# Patient Record
Sex: FEMALE | Race: WHITE | NOT HISPANIC OR LATINO | Employment: PART TIME | URBAN - METROPOLITAN AREA
[De-identification: names, ages, dates, MRNs, and addresses within clinical notes are randomized per-mention and may not be internally consistent; named-entity substitution may affect disease eponyms.]

---

## 2017-05-08 ENCOUNTER — ALLSCRIPTS OFFICE VISIT (OUTPATIENT)
Dept: OTHER | Facility: OTHER | Age: 47
End: 2017-05-08

## 2017-05-31 ENCOUNTER — ALLSCRIPTS OFFICE VISIT (OUTPATIENT)
Dept: OTHER | Facility: OTHER | Age: 47
End: 2017-05-31

## 2017-06-20 ENCOUNTER — GENERIC CONVERSION - ENCOUNTER (OUTPATIENT)
Dept: OTHER | Facility: OTHER | Age: 47
End: 2017-06-20

## 2017-08-10 ENCOUNTER — ALLSCRIPTS OFFICE VISIT (OUTPATIENT)
Dept: OTHER | Facility: OTHER | Age: 47
End: 2017-08-10

## 2017-11-02 ENCOUNTER — GENERIC CONVERSION - ENCOUNTER (OUTPATIENT)
Dept: OTHER | Facility: OTHER | Age: 47
End: 2017-11-02

## 2018-01-10 NOTE — RESULT NOTES
Verified Results  (1) CBC/PLT/DIFF [id number] 11:32AM Cornelius Coats   TW Order Number: [id number]    TW Order Number: [id number]     Test Name Result Flag Reference   WBC COUNT 5 39 Thousand/uL  4 31-10 16   RBC COUNT 4 97 Million/uL  3 81-5 12   HEMOGLOBIN 14 1 g/dL  11 5-15 4   HEMATOCRIT 43 2 %  34 8-46  1   MCV 86 9 fL  82 0-98 0   MCH 28 4 pg  26 8-34 3   MCHC 32 6 g/dL  31 4-37 4   RDW 12 8 %  11 6-15 1   MPV 11 0 fL  8 9-12 7   PLATELET COUNT 175 Thousands/uL  149-390   nRBC AUTOMATED 0 /100 WBCs     NEUTROPHILS RELATIVE PERCENT 56 %  43-75   LYMPHOCYTES RELATIVE PERCENT 34 %  14-44   MONOCYTES RELATIVE PERCENT 7 %  4-12   EOSINOPHILS RELATIVE PERCENT 2 %  0-6   BASOPHILS RELATIVE PERCENT 1 %  0-1   NEUTROPHILS ABSOLUTE COUNT 3 05 Thousands/µL  1 85-7 62   LYMPHOCYTES ABSOLUTE COUNT 1 82 Thousands/µL  0 60-4 47   MONOCYTES ABSOLUTE COUNT 0 38 Thousand/µL  0 17-1 22   EOSINOPHILS ABSOLUTE COUNT 0 08 Thousand/µL  0 00-0 61   BASOPHILS ABSOLUTE COUNT 0 06 Thousands/µL  0 00-0 10     (1) COMPREHENSIVE METABOLIC PANEL [id number] 90:45BF Cornelius Coats    Order Number: [id number]      National Kidney Disease Education Program recommendations are as follows:  GFR calculation is accurate only with a steady state creatinine  Chronic Kidney disease less than 60 ml/min/1 73 sq  meters  Kidney failure less than 15 ml/min/1 73 sq  meters  Test Name Result Flag Reference   GLUCOSE,RANDM 96 mg/dL     If the patient is fasting, the ADA then defines impaired fasting glucose as > 100 mg/dL and diabetes as > or equal to 123 mg/dL     SODIUM 137 mmol/L  136-145   POTASSIUM 4 4 mmol/L  3 5-5 3   CHLORIDE 104 mmol/L  100-108   CARBON DIOXIDE 28 mmol/L  21-32   ANION GAP (CALC) 5 mmol/L  4-13   BLOOD UREA NITROGEN 9 mg/dL  5-25   CREATININE 0 69 mg/dL  0 60-1 30   Standardized to IDMS reference method   CALCIUM 8 7 mg/dL  8 3-10 1   BILI, TOTAL 0 45 mg/dL  0 20-1 00   ALK PHOSPHATAS 63 U/L     ALT (SGPT) 21 U/L 12-78   AST(SGOT) 8 U/L  5-45   ALBUMIN 4 1 g/dL  3 5-5 0   TOTAL PROTEIN 7 0 g/dL  6 4-8 2   eGFR Non-African American      >60 0 ml/min/1 73sq m     (1) TSH WITH FT4 REFLEX 02Feb2016 11:32AM Marielleimmanuel Villeda    Order Number: [id number]    Patients undergoing fluorescein dye angiography may retain small amounts of fluorescein in the body for 48-72 hours post procedure  Samples containing fluorescein can produce falsely depressed TSH values  If the patient had this procedure,a specimen should be resubmitted post fluorescein clearance  The recommended reference ranges for TSH during pregnancy are as follows:  First trimester 0 1 to 2 5 uIU/mL  Second trimester  0 2 to 3 0 uIU/mL  Third trimester 0 3 to 3 0 uIU/m     Test Name Result Flag Reference   TSH 1 110 uIU/mL  0 358-3 740     (1) VITAMIN B12 02Feb2016 11:32AM Marielle [facility]    Order Number: [id number]     Test Name Result Flag Reference   VITAMIN B12 274 pg/mL  100-900     (1) VITAMIN D 25-HYDROXY [id number] 11:32AM Marielle [employer]    Order Number: [id number]    TW Order Number: [id number]     Test Name Result Flag Reference   VIT D 25-HYDROX 19 9 ng/mL L 30 0-100 0     (1) HEMOGLOBIN A1C [id number] 11:32AM Marielle [facility]    Order Number: [id number]      5 7-6 4% impaired fasting glucose  >=6 5% diagnosis of diabetes    Falsely low levels are seen in conditions linked to short RBC life span-  hemolytic anemia, and splenomegaly  Falsely elevated levels are seen in situations where there is an increased production of RBC- receipt of erythropoietin or blood transfusions  Adopted from ADA-Clinical Practice Recommendations     Test Name Result Flag Reference   HEMOGLOBIN A1C 5 4 %  4 0-5 6   EST  AVG   GLUCOSE 108 mg/dl         Plan  Vitamin D deficiency    · Vitamin D (Ergocalciferol) 30231 UNIT Oral Capsule; TAKE 1 CAPSULE WEEKLY

## 2018-01-13 VITALS
WEIGHT: 174.5 LBS | HEIGHT: 67 IN | BODY MASS INDEX: 27.39 KG/M2 | RESPIRATION RATE: 16 BRPM | DIASTOLIC BLOOD PRESSURE: 70 MMHG | SYSTOLIC BLOOD PRESSURE: 120 MMHG | TEMPERATURE: 98.3 F | HEART RATE: 94 BPM

## 2018-01-14 VITALS
RESPIRATION RATE: 18 BRPM | HEART RATE: 76 BPM | DIASTOLIC BLOOD PRESSURE: 74 MMHG | HEIGHT: 67 IN | TEMPERATURE: 97.3 F | WEIGHT: 178 LBS | SYSTOLIC BLOOD PRESSURE: 116 MMHG | BODY MASS INDEX: 27.94 KG/M2

## 2018-01-14 VITALS
WEIGHT: 175.25 LBS | DIASTOLIC BLOOD PRESSURE: 62 MMHG | HEART RATE: 72 BPM | SYSTOLIC BLOOD PRESSURE: 102 MMHG | HEIGHT: 67 IN | RESPIRATION RATE: 16 BRPM | TEMPERATURE: 98.5 F | BODY MASS INDEX: 27.51 KG/M2

## 2018-01-17 NOTE — RESULT NOTES
Message   Will d/w pt at follow up appointment, will send Vit D to pharmacy     Verified Results  (1) CBC/PLT/DIFF [id number] 11:32AM Alejo Hemp Order Number: [id number]    TW Order Number: [id number]     Test Name Result Flag Reference   WBC COUNT 5 39 Thousand/uL  4 31-10 16   RBC COUNT 4 97 Million/uL  3 81-5 12   HEMOGLOBIN 14 1 g/dL  11 5-15 4   HEMATOCRIT 43 2 %  34 8-46  1   MCV 86 9 fL  82 0-98 0   MCH 28 4 pg  26 8-34 3   MCHC 32 6 g/dL  31 4-37 4   RDW 12 8 %  11 6-15 1   MPV 11 0 fL  8 9-12 7   PLATELET COUNT 087 Thousands/uL  149-390   nRBC AUTOMATED 0 /100 WBCs     NEUTROPHILS RELATIVE PERCENT 56 %  43-75   LYMPHOCYTES RELATIVE PERCENT 34 %  14-44   MONOCYTES RELATIVE PERCENT 7 %  4-12   EOSINOPHILS RELATIVE PERCENT 2 %  0-6   BASOPHILS RELATIVE PERCENT 1 %  0-1   NEUTROPHILS ABSOLUTE COUNT 3 05 Thousands/µL  1 85-7 62   LYMPHOCYTES ABSOLUTE COUNT 1 82 Thousands/µL  0 60-4 47   MONOCYTES ABSOLUTE COUNT 0 38 Thousand/µL  0 17-1 22   EOSINOPHILS ABSOLUTE COUNT 0 08 Thousand/µL  0 00-0 61   BASOPHILS ABSOLUTE COUNT 0 06 Thousands/µL  0 00-0 10     (1) COMPREHENSIVE METABOLIC PANEL [id number] 30:56ZN Maria L Cloud    Order Number: [id number]      National Kidney Disease Education Program recommendations are as follows:  GFR calculation is accurate only with a steady state creatinine  Chronic Kidney disease less than 60 ml/min/1 73 sq  meters  Kidney failure less than 15 ml/min/1 73 sq  meters  Test Name Result Flag Reference   GLUCOSE,RANDM 96 mg/dL     If the patient is fasting, the ADA then defines impaired fasting glucose as > 100 mg/dL and diabetes as > or equal to 123 mg/dL     SODIUM 137 mmol/L  136-145   POTASSIUM 4 4 mmol/L  3 5-5 3   CHLORIDE 104 mmol/L  100-108   CARBON DIOXIDE 28 mmol/L  21-32   ANION GAP (CALC) 5 mmol/L  4-13   BLOOD UREA NITROGEN 9 mg/dL  5-25   CREATININE 0 69 mg/dL  0 60-1 30   Standardized to IDMS reference method   CALCIUM 8 7 mg/dL  8 3-10 1   BILI, TOTAL 0 45 mg/dL  0 20-1 00   ALK PHOSPHATAS 63 U/L     ALT (SGPT) 21 U/L  12-78   AST(SGOT) 8 U/L  5-45   ALBUMIN 4 1 g/dL  3 5-5 0   TOTAL PROTEIN 7 0 g/dL  6 4-8 2   eGFR Non-African American      >60 0 ml/min/1 73sq m     (1) TSH WITH FT4 REFLEX [id number] 11:32AM Maria L Cloud    Order Number: [id number]    Patients undergoing fluorescein dye angiography may retain small amounts of fluorescein in the body for 48-72 hours post procedure  Samples containing fluorescein can produce falsely depressed TSH values  If the patient had this procedure,a specimen should be resubmitted post fluorescein clearance          The recommended reference ranges for TSH during pregnancy are as follows:  First trimester 0 1 to 2 5 uIU/mL  Second trimester  0 2 to 3 0 uIU/mL  Third trimester 0 3 to 3 0 uIU/m     Test Name Result Flag Reference   TSH 1 110 uIU/mL  0 358-3 740     (1) VITAMIN B12 [id number] 11:32AM Maria L Cloud    Order Number: [id number]     Test Name Result Flag Reference   VITAMIN B12 274 pg/mL  100-900     (1) VITAMIN D 25-HYDROXY [id number] 11:32AM Maria L Cloud    Order Number: [id number]    TW Order Number: [id number]     Test Name Result Flag Reference   VIT D 25-HYDROX 19 9 ng/mL L 30 0-100 0       Plan  Vitamin D deficiency    · Vitamin D (Ergocalciferol) 53497 UNIT Oral Capsule; TAKE 1 CAPSULE WEEKLY

## 2018-01-22 VITALS
SYSTOLIC BLOOD PRESSURE: 116 MMHG | DIASTOLIC BLOOD PRESSURE: 68 MMHG | TEMPERATURE: 98.7 F | WEIGHT: 185.25 LBS | RESPIRATION RATE: 14 BRPM | HEART RATE: 72 BPM | HEIGHT: 67 IN | BODY MASS INDEX: 29.08 KG/M2

## 2018-07-13 ENCOUNTER — OFFICE VISIT (OUTPATIENT)
Dept: FAMILY MEDICINE CLINIC | Facility: CLINIC | Age: 48
End: 2018-07-13
Payer: COMMERCIAL

## 2018-07-13 VITALS
RESPIRATION RATE: 12 BRPM | DIASTOLIC BLOOD PRESSURE: 82 MMHG | TEMPERATURE: 98.3 F | WEIGHT: 183.8 LBS | SYSTOLIC BLOOD PRESSURE: 120 MMHG | BODY MASS INDEX: 30.62 KG/M2 | HEART RATE: 62 BPM | HEIGHT: 65 IN

## 2018-07-13 DIAGNOSIS — Z13.6 SCREENING FOR CARDIOVASCULAR CONDITION: ICD-10-CM

## 2018-07-13 DIAGNOSIS — E55.9 VITAMIN D DEFICIENCY: ICD-10-CM

## 2018-07-13 DIAGNOSIS — R53.82 CHRONIC FATIGUE: Primary | ICD-10-CM

## 2018-07-13 PROCEDURE — 99213 OFFICE O/P EST LOW 20 MIN: CPT | Performed by: FAMILY MEDICINE

## 2018-07-13 RX ORDER — MULTIVIT-MIN/IRON FUM/FOLIC AC 7.5 MG-4
TABLET ORAL
COMMUNITY

## 2018-07-13 RX ORDER — EPINEPHRINE 0.3 MG/.3ML
INJECTION SUBCUTANEOUS
COMMUNITY
Start: 2013-09-25

## 2018-07-13 NOTE — ASSESSMENT & PLAN NOTE
- patient given scripts for lipid panel, CMP, A1c  - to follow up with Dr Liz Mcpherson  In a month for annual health visit

## 2018-07-13 NOTE — PROGRESS NOTES
Assessment/Plan     Vitamin D deficiency  -  Last vitamin-D level was 20, will recheck    Screening for cardiovascular condition  - patient given scripts for lipid panel, CMP, A1c  - to follow up with Dr Liz Mcpherson  In a month for annual health visit    Chronic fatigue  - will check CBC, TSH  - still not agreeable on antidepressant medications  - patient to follow up with Dr Liz Mcpherson    Diagnoses and all orders for this visit:    Chronic fatigue  -     CBC and Platelet; Future  -     TSH, 3rd generation with T4 reflex; Future    Screening for cardiovascular condition  -     Lipid Panel with Direct LDL reflex; Future  -     Comprehensive metabolic panel; Future    Vitamin D deficiency  -     Vitamin D 25 hydroxy; Future    Other orders  -     EPINEPHrine (EPIPEN) 0 3 mg/0 3 mL SOAJ; Inject as directed  -     Multiple Vitamins-Minerals (MULTIVITAMIN WITH MINERALS) tablet; Take by mouth       Subjective     Chief Complaint   Patient presents with    Insomnia     PT also states she has "stress knots" in her neck         46-year-old female presents to office with complaints of chronic fatigue   And to get script for blood work  Patient states that it has been awhile since she had her blood work done and since  She feels tired all the time wanted to know that everything is fine  She does have depression but does not want to take any pharmacological treatment at this time  Review of Systems   Constitutional: Positive for fatigue  Negative for chills and fever  HENT: Negative for congestion, ear discharge, ear pain, postnasal drip and rhinorrhea  Eyes: Negative for discharge and itching  Respiratory: Negative for shortness of breath  Cardiovascular: Negative for chest pain  Gastrointestinal: Negative for diarrhea, nausea and vomiting  Genitourinary: Negative for difficulty urinating, dysuria and enuresis  Musculoskeletal: Negative for back pain  Neurological: Negative for headaches  Objective     Vitals:Blood pressure 120/82, pulse 62, temperature 98 3 °F (36 8 °C), resp  rate 12, height 5' 5 4" (1 661 m), weight 83 4 kg (183 lb 12 8 oz), last menstrual period 07/02/2018  Physical Exam:  Physical Exam   Constitutional: She is oriented to person, place, and time  She appears well-developed and well-nourished  HENT:   Head: Normocephalic and atraumatic  Mouth/Throat: Oropharynx is clear and moist    Eyes: Conjunctivae and EOM are normal  Pupils are equal, round, and reactive to light  Neck: Normal range of motion  Neck supple  Cardiovascular: Normal rate, regular rhythm, normal heart sounds and intact distal pulses  Exam reveals no friction rub  No murmur heard  Pulmonary/Chest: Effort normal and breath sounds normal  No respiratory distress  She has no wheezes  She has no rales  Abdominal: Soft  Bowel sounds are normal  She exhibits no distension  There is no tenderness  Musculoskeletal: Normal range of motion  Neurological: She is alert and oriented to person, place, and time  Skin: Skin is warm and dry

## 2018-07-13 NOTE — ASSESSMENT & PLAN NOTE
- will check CBC, TSH  - still not agreeable on antidepressant medications,  PHQ-9 score 13  - patient to follow up with Dr Jena Hernández

## 2018-07-16 LAB
25(OH)D3 SERPL-MCNC: 18 NG/ML (ref 30–100)
ALBUMIN SERPL-MCNC: 4.2 G/DL (ref 3.6–5.1)
ALBUMIN/GLOB SERPL: 2.2 (CALC) (ref 1–2.5)
ALP SERPL-CCNC: 56 U/L (ref 33–115)
ALT SERPL-CCNC: 15 U/L (ref 6–29)
AST SERPL-CCNC: 13 U/L (ref 10–35)
BILIRUB SERPL-MCNC: 0.5 MG/DL (ref 0.2–1.2)
BUN SERPL-MCNC: 13 MG/DL (ref 7–25)
BUN/CREAT SERPL: ABNORMAL (CALC) (ref 6–22)
CALCIUM SERPL-MCNC: 9.2 MG/DL (ref 8.6–10.2)
CHLORIDE SERPL-SCNC: 106 MMOL/L (ref 98–110)
CHOLEST SERPL-MCNC: 212 MG/DL
CHOLEST/HDLC SERPL: 4.8 (CALC)
CO2 SERPL-SCNC: 29 MMOL/L (ref 20–31)
CREAT SERPL-MCNC: 0.76 MG/DL (ref 0.5–1.1)
ERYTHROCYTE [DISTWIDTH] IN BLOOD BY AUTOMATED COUNT: 12.2 % (ref 11–15)
GLOBULIN SER CALC-MCNC: 1.9 G/DL (CALC) (ref 1.9–3.7)
GLUCOSE SERPL-MCNC: 103 MG/DL (ref 65–99)
HCT VFR BLD AUTO: 44 % (ref 35–45)
HDLC SERPL-MCNC: 44 MG/DL
HGB BLD-MCNC: 14.5 G/DL (ref 11.7–15.5)
LDLC SERPL CALC-MCNC: 138 MG/DL (CALC)
MCH RBC QN AUTO: 29.1 PG (ref 27–33)
MCHC RBC AUTO-ENTMCNC: 33 G/DL (ref 32–36)
MCV RBC AUTO: 88.4 FL (ref 80–100)
NONHDLC SERPL-MCNC: 168 MG/DL (CALC)
PLATELET # BLD AUTO: 318 THOUSAND/UL (ref 140–400)
PMV BLD REES-ECKER: 10.5 FL (ref 7.5–12.5)
POTASSIUM SERPL-SCNC: 4.7 MMOL/L (ref 3.5–5.3)
PROT SERPL-MCNC: 6.1 G/DL (ref 6.1–8.1)
RBC # BLD AUTO: 4.98 MILLION/UL (ref 3.8–5.1)
SL AMB EGFR AFRICAN AMERICAN: 108 ML/MIN/1.73M2
SL AMB EGFR NON AFRICAN AMERICAN: 93 ML/MIN/1.73M2
SODIUM SERPL-SCNC: 140 MMOL/L (ref 135–146)
TRIGL SERPL-MCNC: 166 MG/DL
TSH SERPL-ACNC: 1.78 MIU/L
WBC # BLD AUTO: 6 THOUSAND/UL (ref 3.8–10.8)

## 2018-07-27 PROBLEM — E78.2 MIXED HYPERLIPIDEMIA: Status: ACTIVE | Noted: 2018-07-27

## 2018-07-27 NOTE — PROGRESS NOTES
Assessment/Plan     Vitamin D deficiency  Vit D 18  Start repletion x 12 weeks  Chronic fatigue  Multifactorial - depression certainly plays a role (PHQ-9 score of 8)  Vit D also quite low  Will replete Vit D  Discussed mindfulness, exercise, and offered therapy visit if patient wishes at some point  She will keep me posted re: her symptoms  Mixed hyperlipidemia  ASCVD risk 1 3%  Discussed nutritional interventions, especially decreasing refined carbs  Pt understands and agrees  rtc 6 months    Subjective     Chief Complaint: f/u fatigue, depressed mood, bloodwork    HPI:   HPI    Seen in followup  Feels that her sleep and fatigue have been stable, with maybe some worsening of fatigue  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  Has had nighttime awakenings with anxiety several times  Walks, listens to music for stress relief  Sometimes notes waking in the morning not feeling refreshed  Reports that her mood is stable, and isn't holding her back from her normal activities  Has some muscle tension in her shoulders up into her neck - does some massage  Uses some Tiger Balm  Sometimes leads to headaches  The following portions of the patient's history were reviewed and updated as appropriate: allergies, current medications, past family history, past medical history, past social history, past surgical history and problem list     Review of Systems  Review of Systems   Constitutional: Positive for fatigue  Negative for activity change, chills and fever  HENT: Negative for congestion, sinus pain, sinus pressure and sore throat  Respiratory: Negative for cough, shortness of breath and wheezing  Cardiovascular: Negative for chest pain, palpitations and leg swelling  Gastrointestinal: Negative for abdominal pain, diarrhea, nausea and vomiting  Genitourinary: Negative for decreased urine volume, dysuria, frequency and urgency     Musculoskeletal: Negative for arthralgias, myalgias, neck pain and neck stiffness  Skin: Negative for rash  Neurological: Negative for dizziness, light-headedness, numbness and headaches  Psychiatric/Behavioral: Positive for sleep disturbance  Negative for dysphoric mood and suicidal ideas  The patient is nervous/anxious  Objective   Vitals:    07/30/18 0834   BP: 120/70   Pulse: 78   Temp: 98 2 °F (36 8 °C)       Physical Exam   Constitutional: She is oriented to person, place, and time  She appears well-developed and well-nourished  No distress  HENT:   Head: Normocephalic and atraumatic  Eyes: Conjunctivae and EOM are normal  Pupils are equal, round, and reactive to light  Neck: Normal range of motion  Neck supple  Cardiovascular: Normal rate, regular rhythm and normal heart sounds  No murmur heard  Pulmonary/Chest: Effort normal and breath sounds normal  No respiratory distress  Abdominal: Soft  Bowel sounds are normal  There is no tenderness  Musculoskeletal: Normal range of motion  Neurological: She is alert and oriented to person, place, and time  Skin: Skin is warm and dry  Psychiatric: She has a normal mood and affect  Her behavior is normal  Judgment and thought content normal        Lab Results:   Orders Only on 07/14/2018   Component Date Value Ref Range Status    Total Cholesterol 07/14/2018 212* <200 mg/dL Final    HDL 07/14/2018 44* >50 mg/dL Final    Triglycerides 07/14/2018 166* <150 mg/dL Final    LDL Direct 07/14/2018 138* mg/dL (calc) Final    Comment: Reference range: <100     Desirable range <100 mg/dL for primary prevention;    <70 mg/dL for patients with CHD or diabetic patients   with > or = 2 CHD risk factors  LDL-C is now calculated using the Sukhjinder-Luis   calculation, which is a validated novel method providing   better accuracy than the Friedewald equation in the   estimation of LDL-C  Tamara Fajardo  [facility]   5624;861(81): 0298-3980 (http://[employer]/faq/VZC520)      Chol HDLC Ratio 07/14/2018 4 8  <5 0 (calc) Final    Non-HDL Cholesterol 07/14/2018 168* <130 mg/dL (calc) Final    Comment: For patients with diabetes plus 1 major ASCVD risk   factor, treating to a non-HDL-C goal of <100 mg/dL   (LDL-C of <70 mg/dL) is considered a therapeutic   option        SL AMB GLUCOSE 07/14/2018 103* 65 - 99 mg/dL Final    Comment:               Fasting reference interval     For someone without known diabetes, a glucose value  between 100 and 125 mg/dL is consistent with  prediabetes and should be confirmed with a  follow-up test          BUN 07/14/2018 13  7 - 25 mg/dL Final    Creatinine, Serum 07/14/2018 0 76  0 50 - 1 10 mg/dL Final    eGFR Non  07/14/2018 93  > OR = 60 mL/min/1 73m2 Final    SL AMB EGFR  07/14/2018 108  > OR = 60 mL/min/1 73m2 Final    SL AMB BUN/CREATININE RATIO 42/61/6969 NOT APPLICABLE  6 - 22 (calc) Final    SL AMB SODIUM 07/14/2018 140  135 - 146 mmol/L Final    SL AMB POTASSIUM 07/14/2018 4 7  3 5 - 5 3 mmol/L Final    SL AMB CHLORIDE 07/14/2018 106  98 - 110 mmol/L Final    SL AMB CARBON DIOXIDE 07/14/2018 29  20 - 31 mmol/L Final    SL AMB CALCIUM 07/14/2018 9 2  8 6 - 10 2 mg/dL Final    SL AMB PROTEIN, TOTAL 07/14/2018 6 1  6 1 - 8 1 g/dL Final    Serum Albumin 07/14/2018 4 2  3 6 - 5 1 g/dL Final    SL AMB GLOBULIN 07/14/2018 1 9  1 9 - 3 7 g/dL (calc) Final    SL AMB ALBUMIN/GLOBULIN RATIO 07/14/2018 2 2  1 0 - 2 5 (calc) Final    SL AMB BILIRUBIN, TOTAL 07/14/2018 0 5  0 2 - 1 2 mg/dL Final    SL AMB ALKALINE PHOSPHATASE 07/14/2018 56  33 - 115 U/L Final    SL AMB AST 07/14/2018 13  10 - 35 U/L Final    SL AMB ALT 07/14/2018 15  6 - 29 U/L Final    SL AMB LAB WHITE BLOOD CELL COUNT 07/14/2018 6 0  3 8 - 10 8 Thousand/uL Final    SL AMB LAB RED BLOOD CELLS 07/14/2018 4 98  3 80 - 5 10 Million/uL Final    Hemoglobin 07/14/2018 14 5  11 7 - 15 5 g/dL Final    Hematocrit 07/14/2018 44 0  35 0 - 45 0 % Final    MCV 07/14/2018 88 4  80 0 - 100 0 fL Final    MCH 07/14/2018 29 1  27 0 - 33 0 pg Final    MCHC 07/14/2018 33 0  32 0 - 36 0 g/dL Final    RDW 07/14/2018 12 2  11 0 - 15 0 % Final    Platelet Count 21/98/7554 318  140 - 400 Thousand/uL Final    SL AMB MPV 07/14/2018 10 5  7 5 - 12 5 fL Final    Vitamin D, 25-Hydroxy, Serum 07/14/2018 18* 30 - 100 ng/mL Final    Comment: Vitamin D Status         25-OH Vitamin D:     Deficiency:                    <20 ng/mL  Insufficiency:             20 - 29 ng/mL  Optimal:                 > or = 30 ng/mL     For 25-OH Vitamin D testing on patients on   D2-supplementation and patients for whom quantitation   of D2 and D3 fractions is required, the QuestAssureD(TM)  25-OH VIT D, (D2,D3), LC/MS/MS is recommended: order   code 91228 (patients >2yrs)  For more information on this test, go to:  http://[employer]/faq/KXT120  (This link is being provided for   informational/educational purposes only )      SL AMB TSH W/ REFLEX TO FREE T4 07/14/2018 1 78  mIU/L Final    Comment:           Reference Range                         > or = 20 Years  0 40-4 50                              Pregnancy Ranges            First trimester    0 26-2 66            Second trimester   0 55-2 73            Third trimester    0 43-2 91     ]

## 2018-07-30 ENCOUNTER — OFFICE VISIT (OUTPATIENT)
Dept: FAMILY MEDICINE CLINIC | Facility: CLINIC | Age: 48
End: 2018-07-30
Payer: COMMERCIAL

## 2018-07-30 ENCOUNTER — TELEPHONE (OUTPATIENT)
Dept: FAMILY MEDICINE CLINIC | Facility: CLINIC | Age: 48
End: 2018-07-30

## 2018-07-30 VITALS
WEIGHT: 185.6 LBS | HEIGHT: 66 IN | HEART RATE: 78 BPM | BODY MASS INDEX: 29.83 KG/M2 | DIASTOLIC BLOOD PRESSURE: 70 MMHG | SYSTOLIC BLOOD PRESSURE: 120 MMHG | TEMPERATURE: 98.2 F

## 2018-07-30 DIAGNOSIS — E55.9 VITAMIN D DEFICIENCY: ICD-10-CM

## 2018-07-30 DIAGNOSIS — E78.2 MIXED HYPERLIPIDEMIA: ICD-10-CM

## 2018-07-30 DIAGNOSIS — R53.82 CHRONIC FATIGUE: Primary | ICD-10-CM

## 2018-07-30 PROCEDURE — 99214 OFFICE O/P EST MOD 30 MIN: CPT | Performed by: FAMILY MEDICINE

## 2018-07-30 PROCEDURE — 3008F BODY MASS INDEX DOCD: CPT | Performed by: FAMILY MEDICINE

## 2018-07-30 RX ORDER — CHOLECALCIFEROL (VITAMIN D3) 1250 MCG
1 CAPSULE ORAL WEEKLY
Qty: 12 CAPSULE | Refills: 0 | Status: SHIPPED | OUTPATIENT
Start: 2018-07-30 | End: 2018-10-16

## 2018-07-30 NOTE — ASSESSMENT & PLAN NOTE
ASCVD risk 1 3%  Discussed nutritional interventions, especially decreasing refined carbs  Pt understands and agrees

## 2018-07-30 NOTE — ASSESSMENT & PLAN NOTE
Multifactorial - depression certainly plays a role (PHQ-9 score of 8)  Vit D also quite low  Will replete Vit D  Discussed mindfulness, exercise, and offered therapy visit if patient wishes at some point  She will keep me posted re: her symptoms

## 2018-08-22 ENCOUNTER — ANNUAL EXAM (OUTPATIENT)
Dept: OBGYN CLINIC | Facility: CLINIC | Age: 48
End: 2018-08-22
Payer: COMMERCIAL

## 2018-08-22 VITALS
WEIGHT: 187 LBS | SYSTOLIC BLOOD PRESSURE: 108 MMHG | BODY MASS INDEX: 31.16 KG/M2 | DIASTOLIC BLOOD PRESSURE: 76 MMHG | HEIGHT: 65 IN

## 2018-08-22 DIAGNOSIS — D25.1 INTRAMURAL LEIOMYOMA OF UTERUS: ICD-10-CM

## 2018-08-22 DIAGNOSIS — Z01.419 ENCOUNTER FOR ANNUAL ROUTINE GYNECOLOGICAL EXAMINATION: Primary | ICD-10-CM

## 2018-08-22 PROCEDURE — 87624 HPV HI-RISK TYP POOLED RSLT: CPT | Performed by: OBSTETRICS & GYNECOLOGY

## 2018-08-22 PROCEDURE — G0143 SCR C/V CYTO,THINLAYER,RESCR: HCPCS | Performed by: OBSTETRICS & GYNECOLOGY

## 2018-08-22 PROCEDURE — S0612 ANNUAL GYNECOLOGICAL EXAMINA: HCPCS | Performed by: OBSTETRICS & GYNECOLOGY

## 2018-08-22 NOTE — PROGRESS NOTES
Assessment/Plan:    Pap smear done as well as annual   Encouraged self-breast examination as well as calcium supplementation  Continue annual mammogram, 3D given breast density  Return to office in 1 year  No problem-specific Assessment & Plan notes found for this encounter  Diagnoses and all orders for this visit:    Encounter for annual routine gynecological examination          Subjective:      Patient ID: Holden Couch is a 53 y o  female  HPI     This is a 78-year-old female  ( x2) presents for annual gyn exam   Patient underwent a NovaSure ablation approximately 2 years ago  Her cycles are regular every 4 weeks lasting 5 days significantly lighter since her ablation  She denies any changes in bowel or bladder function  She is up-to-date with her screening lab work which has been normal  She underwent her mammogram in  which was normal, 3D  Patient is sexually active and has been in a monogamous relationship for over 21 years  All her Pap smears have been normal   Last Pap smear  within normal limits  The following portions of the patient's history were reviewed and updated as appropriate: allergies, current medications, past family history, past medical history, past social history, past surgical history and problem list     Review of Systems   Constitutional: Negative for fatigue, fever and unexpected weight change  Respiratory: Negative for cough, chest tightness, shortness of breath and wheezing  Cardiovascular: Negative  Negative for chest pain and palpitations  Gastrointestinal: Negative  Negative for abdominal distention, abdominal pain, blood in stool, constipation, diarrhea, nausea and vomiting  Genitourinary: Negative  Negative for difficulty urinating, dyspareunia, dysuria, flank pain, frequency, genital sores, hematuria, pelvic pain, urgency, vaginal bleeding, vaginal discharge and vaginal pain  Skin: Negative for rash           Objective:      BP 108/76   Ht 5' 5" (1 651 m)   Wt 84 8 kg (187 lb)   LMP 08/01/2018 (Exact Date)   Breastfeeding? No   BMI 31 12 kg/m²          Physical Exam   Constitutional: She appears well-developed and well-nourished  Cardiovascular: Normal rate and regular rhythm  Pulmonary/Chest: Effort normal and breath sounds normal  Right breast exhibits no inverted nipple, no mass, no nipple discharge, no skin change and no tenderness  Left breast exhibits no inverted nipple, no mass, no nipple discharge, no skin change and no tenderness  Abdominal: Soft  Bowel sounds are normal  She exhibits no distension  There is no tenderness  There is no rebound and no guarding  Genitourinary: Rectum normal, vagina normal and uterus normal  There is no lesion on the right labia  There is no lesion on the left labia  Cervix exhibits no discharge and no friability  Right adnexum displays no mass, no tenderness and no fullness  Left adnexum displays no mass, no tenderness and no fullness  No vaginal discharge found  Genitourinary Comments: Uterus is globular top normal size  Rectovaginal exam is confirmatory

## 2018-08-24 LAB
HPV HR 12 DNA CVX QL NAA+PROBE: NEGATIVE
HPV16 DNA CVX QL NAA+PROBE: NEGATIVE
HPV18 DNA CVX QL NAA+PROBE: NEGATIVE

## 2018-08-27 LAB
LAB AP GYN PRIMARY INTERPRETATION: NORMAL
LAB AP LMP: NORMAL
Lab: NORMAL

## 2018-10-30 DIAGNOSIS — E55.9 VITAMIN D DEFICIENCY: Primary | ICD-10-CM

## 2018-10-30 DIAGNOSIS — E55.9 VITAMIN D DEFICIENCY: ICD-10-CM

## 2018-10-30 RX ORDER — METHOCARBAMOL 750 MG/1
TABLET ORAL WEEKLY
Qty: 12 CAPSULE | Refills: 0 | OUTPATIENT
Start: 2018-10-30 | End: 2019-01-16

## 2019-03-22 DIAGNOSIS — Z13.6 SCREENING FOR CARDIOVASCULAR CONDITION: Primary | ICD-10-CM

## 2019-03-22 DIAGNOSIS — E55.9 VITAMIN D DEFICIENCY: ICD-10-CM

## 2019-03-22 DIAGNOSIS — R53.82 CHRONIC FATIGUE: ICD-10-CM

## 2019-03-22 DIAGNOSIS — E78.2 MIXED HYPERLIPIDEMIA: ICD-10-CM

## 2019-04-10 ENCOUNTER — OFFICE VISIT (OUTPATIENT)
Dept: OBGYN CLINIC | Facility: CLINIC | Age: 49
End: 2019-04-10
Payer: COMMERCIAL

## 2019-04-10 VITALS
SYSTOLIC BLOOD PRESSURE: 128 MMHG | WEIGHT: 190 LBS | BODY MASS INDEX: 31.65 KG/M2 | HEIGHT: 65 IN | DIASTOLIC BLOOD PRESSURE: 84 MMHG

## 2019-04-10 DIAGNOSIS — N76.0 ACUTE VAGINITIS: Primary | ICD-10-CM

## 2019-04-10 DIAGNOSIS — R39.9 LOWER URINARY TRACT SYMPTOMS: ICD-10-CM

## 2019-04-10 LAB
SL AMB  POCT GLUCOSE, UA: ABNORMAL
SL AMB LEUKOCYTE ESTERASE,UA: ABNORMAL
SL AMB POCT BILIRUBIN,UA: ABNORMAL
SL AMB POCT BLOOD,UA: 2
SL AMB POCT CLARITY,UA: CLEAR
SL AMB POCT COLOR,UA: CLEAR
SL AMB POCT KETONES,UA: ABNORMAL
SL AMB POCT NITRITE,UA: ABNORMAL
SL AMB POCT PH,UA: 6
SL AMB POCT SPECIFIC GRAVITY,UA: 1
SL AMB POCT URINE PROTEIN: ABNORMAL
SL AMB POCT UROBILINOGEN: ABNORMAL

## 2019-04-10 PROCEDURE — 99213 OFFICE O/P EST LOW 20 MIN: CPT | Performed by: NURSE PRACTITIONER

## 2019-04-10 PROCEDURE — 81002 URINALYSIS NONAUTO W/O SCOPE: CPT | Performed by: NURSE PRACTITIONER

## 2019-04-10 RX ORDER — MELATONIN
1000 DAILY
COMMUNITY

## 2019-04-12 ENCOUNTER — TELEPHONE (OUTPATIENT)
Dept: OBGYN CLINIC | Facility: CLINIC | Age: 49
End: 2019-04-12

## 2019-04-13 LAB
A VAGINAE DNA VAG NAA+PROBE-LOG#: NOT DETECTED
C GLABRATA DNA VAG QL NAA+PROBE: DETECTED
CANDIDA DNA VAG QL NAA+PROBE: NOT DETECTED
G VAGINALIS DNA VAG NAA+PROBE-LOG#: NOT DETECTED
LACTOBACILLUS DNA VAG NAA+PROBE-LOG#: 6.9 LOG (CELLS/ML)
MEGASPHAERA SP DNA VAG NAA+PROBE-LOG#: NOT DETECTED
SL AMB BV CATEGORY:: NORMAL
SL AMB C. PARAPSILOSIS, DNA: NOT DETECTED
SL AMB C. TROPICALIS, DNA: NOT DETECTED

## 2019-04-15 ENCOUNTER — TELEPHONE (OUTPATIENT)
Dept: OBGYN CLINIC | Facility: CLINIC | Age: 49
End: 2019-04-15

## 2019-04-15 DIAGNOSIS — B37.3 VAGINAL CANDIDA: Primary | ICD-10-CM

## 2019-04-15 RX ORDER — FLUCONAZOLE 100 MG/1
100 TABLET ORAL DAILY
Qty: 2 TABLET | Refills: 0 | Status: SHIPPED | OUTPATIENT
Start: 2019-04-15 | End: 2019-04-17

## 2019-05-14 LAB
25(OH)D3 SERPL-MCNC: 22 NG/ML (ref 30–100)
ALBUMIN SERPL-MCNC: 4.1 G/DL (ref 3.6–5.1)
ALBUMIN/GLOB SERPL: 2 (CALC) (ref 1–2.5)
ALP SERPL-CCNC: 59 U/L (ref 33–115)
ALT SERPL-CCNC: 16 U/L (ref 6–29)
AST SERPL-CCNC: 14 U/L (ref 10–35)
BASOPHILS # BLD AUTO: 81 CELLS/UL (ref 0–200)
BASOPHILS NFR BLD AUTO: 1.4 %
BILIRUB SERPL-MCNC: 0.4 MG/DL (ref 0.2–1.2)
BUN SERPL-MCNC: 11 MG/DL (ref 7–25)
BUN/CREAT SERPL: NORMAL (CALC) (ref 6–22)
CALCIUM SERPL-MCNC: 9 MG/DL (ref 8.6–10.2)
CHLORIDE SERPL-SCNC: 104 MMOL/L (ref 98–110)
CHOLEST SERPL-MCNC: 203 MG/DL
CHOLEST/HDLC SERPL: 4.6 (CALC)
CO2 SERPL-SCNC: 28 MMOL/L (ref 20–32)
CREAT SERPL-MCNC: 0.8 MG/DL (ref 0.5–1.1)
EOSINOPHIL # BLD AUTO: 278 CELLS/UL (ref 15–500)
EOSINOPHIL NFR BLD AUTO: 4.8 %
ERYTHROCYTE [DISTWIDTH] IN BLOOD BY AUTOMATED COUNT: 12.2 % (ref 11–15)
GLOBULIN SER CALC-MCNC: 2.1 G/DL (CALC) (ref 1.9–3.7)
GLUCOSE SERPL-MCNC: 97 MG/DL (ref 65–99)
HCT VFR BLD AUTO: 42.9 % (ref 35–45)
HDLC SERPL-MCNC: 44 MG/DL
HGB BLD-MCNC: 14.5 G/DL (ref 11.7–15.5)
LDLC SERPL CALC-MCNC: 133 MG/DL (CALC)
LYMPHOCYTES # BLD AUTO: 2018 CELLS/UL (ref 850–3900)
LYMPHOCYTES NFR BLD AUTO: 34.8 %
MCH RBC QN AUTO: 29.1 PG (ref 27–33)
MCHC RBC AUTO-ENTMCNC: 33.8 G/DL (ref 32–36)
MCV RBC AUTO: 86.1 FL (ref 80–100)
MONOCYTES # BLD AUTO: 418 CELLS/UL (ref 200–950)
MONOCYTES NFR BLD AUTO: 7.2 %
NEUTROPHILS # BLD AUTO: 3004 CELLS/UL (ref 1500–7800)
NEUTROPHILS NFR BLD AUTO: 51.8 %
NONHDLC SERPL-MCNC: 159 MG/DL (CALC)
PLATELET # BLD AUTO: 323 THOUSAND/UL (ref 140–400)
PMV BLD REES-ECKER: 10.9 FL (ref 7.5–12.5)
POTASSIUM SERPL-SCNC: 4.8 MMOL/L (ref 3.5–5.3)
PROT SERPL-MCNC: 6.2 G/DL (ref 6.1–8.1)
RBC # BLD AUTO: 4.98 MILLION/UL (ref 3.8–5.1)
SL AMB EGFR AFRICAN AMERICAN: 101 ML/MIN/1.73M2
SL AMB EGFR NON AFRICAN AMERICAN: 87 ML/MIN/1.73M2
SODIUM SERPL-SCNC: 138 MMOL/L (ref 135–146)
TRIGL SERPL-MCNC: 148 MG/DL
TSH SERPL-ACNC: 1.74 MIU/L
WBC # BLD AUTO: 5.8 THOUSAND/UL (ref 3.8–10.8)

## 2019-09-11 NOTE — PROGRESS NOTES
Family Medicine Follow-Up Office Visit  Maddi Plasencia 51 y o  female   MRN: [id number] : 1970  ENCOUNTER: 2019 10:16 AM    Assessment and Plan   Vision changes  With worsening L eye vision changes in the absence of ophthalmologic pathology, especially with patient's history of migraines and L sided neurological symptoms that accompany her migraines, it's appropriate at this time to perform neuroimaging in the form of MRI  Mixed hyperlipidemia  Counseling given on increased legume consumption, adding exercise in the form of walking, avoiding processed grains and soda  Vitamin D deficiency  Vit D 22 (insufficient)  Recommended sun exposure and daily Elías-D supplement  Anxiety  Persistent, related to work an many other components of daily life  Recommended margie-based MBSR (recommended [facility] margie for mindfulness and breathing exercises), as well as consideration of CBT for sleep margie through [facility] 3 months, consider OMT for neck and shoulder spasm    Chief Complaint     Chief Complaint   Patient presents with    Follow-up       History of Present Illness   Maddi Plasencia is a 51y o -year-old female who presents today for followup of bloodwork and in followup of fatigue, hyperlipidemia and Vit D deficiency  Still stressed by long commute to work (with mother)  Over the past year, has had L>R eye issue  Sometimes feels like there's "air around the eye," and has some trouble getting the eye to focus  Has seen ophthalmologist several times, changed rx, no major change in symptoms  Has been on computer more than ever  This L side tends to be where her migraine symptoms have been historically  This is her eye with strabismus as well  Stress level high, not enough sleep, quality isn't great   +snoring  Mainly only drinking caffeine in the AM   Had sleep study with borderline CHRIS previously, didn't tolerate CPAP  Recent labs remarkable for TChol 203, , Vit D 22        Review of Systems   Review of Systems   Constitutional: Negative for activity change, chills, fatigue and fever  HENT: Negative for congestion, sinus pressure, sinus pain and sore throat  Respiratory: Negative for cough, shortness of breath and wheezing  Cardiovascular: Negative for chest pain, palpitations and leg swelling  Gastrointestinal: Negative for abdominal pain, diarrhea, nausea and vomiting  Genitourinary: Negative for decreased urine volume, dysuria, frequency and urgency  Musculoskeletal: Negative for arthralgias, myalgias, neck pain and neck stiffness  Skin: Negative for rash  Neurological: Negative for dizziness, light-headedness, numbness and headaches  Active Problem List     Patient Active Problem List   Diagnosis    Menorrhagia with regular cycle    Chronic fatigue    Screening for cardiovascular condition    Vitamin D deficiency    Mixed hyperlipidemia    Vision changes    Anxiety       Past Medical History, Past Surgical History, Family History, and Social History were reviewed and updated today as appropriate  Objective   /80   Pulse 76   Temp 98 2 °F (36 8 °C)   Resp 16   Ht 5' 5" (1 651 m)   Wt 84 4 kg (186 lb)   BMI 30 95 kg/m²     Physical Exam   Constitutional: She is oriented to person, place, and time  She appears well-developed and well-nourished  No distress  HENT:   Head: Normocephalic and atraumatic  Eyes: Pupils are equal, round, and reactive to light  Neck: Normal range of motion  Neck supple  Cardiovascular: Normal rate, regular rhythm and normal heart sounds  Exam reveals no gallop and no friction rub  No murmur heard  Pulmonary/Chest: Effort normal and breath sounds normal  No respiratory distress  She has no wheezes  She has no rales  Abdominal: Soft  She exhibits no distension  Musculoskeletal: Normal range of motion  Neurological: She is alert and oriented to person, place, and time     Psychiatric: She has a normal mood and affect   Her behavior is normal  Thought content normal      Diabetic Foot Exam    Pertinent Laboratory/Diagnostic Studies:  Lab Results   Component Value Date    BUN 11 05/13/2019    CREATININE 0 80 05/13/2019    CALCIUM 9 0 05/13/2019    K 4 8 05/13/2019    CO2 28 05/13/2019     05/13/2019     Lab Results   Component Value Date    ALT 16 05/13/2019    AST 14 05/13/2019    ALKPHOS 59 05/13/2019       Lab Results   Component Value Date    WBC 5 8 05/13/2019    HGB 14 5 05/13/2019    HCT 42 9 05/13/2019    MCV 86 1 05/13/2019     05/13/2019       No results found for: TSH    Lab Results   Component Value Date    CHOL 209 (H) 11/07/2014     Lab Results   Component Value Date    TRIG 148 05/13/2019     Lab Results   Component Value Date    HDL 44 (L) 05/13/2019     No results found for: [facility]  Lab Results   Component Value Date    HGBA1C 5 4 02/02/2016       Results for orders placed or performed in visit on 05/13/19   Lipid Panel with Direct LDL reflex   Result Value Ref Range    Total Cholesterol 203 (H) <200 mg/dL    HDL 44 (L) >50 mg/dL    Triglycerides 148 <150 mg/dL    LDL Direct 133 (H) mg/dL (calc)    Chol HDLC Ratio 4 6 <5 0 (calc)    Non-HDL Cholesterol 159 (H) <130 mg/dL (calc)   Comprehensive metabolic panel   Result Value Ref Range    Glucose, Random 97 65 - 99 mg/dL    BUN 11 7 - 25 mg/dL    Creatinine 0 80 0 50 - 1 10 mg/dL    eGFR Non  87 > OR = 60 mL/min/1 73m2    eGFR  101 > OR = 60 mL/min/1 73m2    SL AMB BUN/CREATININE RATIO NOT APPLICABLE 6 - 22 (calc)    Sodium 138 135 - 146 mmol/L    Potassium 4 8 3 5 - 5 3 mmol/L    Chloride 104 98 - 110 mmol/L    CO2 28 20 - 32 mmol/L    SL AMB CALCIUM 9 0 8 6 - 10 2 mg/dL    Protein, Total 6 2 6 1 - 8 1 g/dL    Albumin 4 1 3 6 - 5 1 g/dL    Globulin 2 1 1 9 - 3 7 g/dL (calc)    Albumin/Globulin Ratio 2 0 1 0 - 2 5 (calc)    TOTAL BILIRUBIN 0 4 0 2 - 1 2 mg/dL    Alkaline Phosphatase 59 33 - 115 U/L    AST 14 10 - 35 U/L    ALT 16 6 - 29 U/L   CBC and differential   Result Value Ref Range    White Blood Cell Count 5 8 3 8 - 10 8 Thousand/uL    Red Blood Cell Count 4 98 3 80 - 5 10 Million/uL    Hemoglobin 14 5 11 7 - 15 5 g/dL    HCT 42 9 35 0 - 45 0 %    MCV 86 1 80 0 - 100 0 fL    MCH 29 1 27 0 - 33 0 pg    MCHC 33 8 32 0 - 36 0 g/dL    RDW 12 2 11 0 - 15 0 %    Platelet Count 953 446 - 400 Thousand/uL    SL AMB MPV 10 9 7 5 - 12 5 fL    Neutrophils (Absolute) 3,004 1,500 - 7,800 cells/uL    Lymphocytes (Absolute) 2,018 850 - 3,900 cells/uL    Monocytes (Absolute) 418 200 - 950 cells/uL    Eosinophils (Absolute) 278 15 - 500 cells/uL    Basophils ABS 81 0 - 200 cells/uL    Neutrophils 51 8 %    Lymphocytes 34 8 %    Monocytes 7 2 %    Eosinophils 4 8 %    Basophils PCT 1 4 %   Vitamin D 25 hydroxy   Result Value Ref Range    Vitamin D, 25-Hydroxy, Serum 22 (L) 30 - 100 ng/mL   TSH, 3rd generation with Free T4 reflex   Result Value Ref Range    TSH W/RFX TO FREE T4 1 74 mIU/L       Orders Placed This Encounter   Procedures    MRI brain w wo contrast         Current Medications     Current Outpatient Medications   Medication Sig Dispense Refill    aspirin 81 mg chewable tablet Chew 81 mg continuous as needed for mild pain   aspirin-acetaminophen-caffeine (EXCEDRIN MIGRAINE) 250-250-65 MG per tablet Take 1 tablet by mouth every 6 (six) hours as needed for headaches   EPINEPHrine (EPIPEN) 0 3 mg/0 3 mL SOAJ Inject as directed      Multiple Vitamins-Minerals (MULTIVITAMIN WITH MINERALS) tablet Take by mouth      cholecalciferol (VITAMIN D3) 1,000 units tablet Take 1,000 Units by mouth daily      Cholecalciferol (VITAMIN D3) 04475 units CAPS Take 1 capsule (50,000 Units total) by mouth once a week for 12 doses (Patient not taking: Reported on 9/12/2019) 12 capsule 0     No current facility-administered medications for this visit          ALLERGIES:  Allergies   Allergen Reactions    Erythromycin GI Intolerance Health Maintenance     Health Maintenance   Topic Date Due    BMI: Followup Plan  01/23/1989    INFLUENZA VACCINE  07/01/2019    MAMMOGRAM  07/30/2020    Depression Screening PHQ  09/12/2020    BMI: Adult  09/12/2020    PAP SMEAR  08/22/2021    DTaP,Tdap,and Td Vaccines (2 - Td) 11/07/2026    Pneumococcal Vaccine: 65+ Years (1 of 2 - PCV13) 01/23/2036    Pneumococcal Vaccine: Pediatrics (0 to 5 Years) and At-Risk Patients (6 to 59 Years)  Aged Out    HEPATITIS B VACCINES  Aged [employer] History   Administered Date(s) Administered    Influenza Quadrivalent, 6-35 Months IM 11/07/2016    Tdap 11/07/2016    Tuberculin Skin Test-PPD Intradermal 05/09/2014         Estefani Mccullough MD   [address]  9/12/2019  10:16 AM    Parts of this note were dictated using [employer] dictation software and may have sounds-like errors due to variation in pronunciation

## 2019-09-12 ENCOUNTER — OFFICE VISIT (OUTPATIENT)
Dept: FAMILY MEDICINE CLINIC | Facility: CLINIC | Age: 49
End: 2019-09-12
Payer: COMMERCIAL

## 2019-09-12 VITALS
HEART RATE: 76 BPM | WEIGHT: 186 LBS | TEMPERATURE: 98.2 F | HEIGHT: 65 IN | RESPIRATION RATE: 16 BRPM | BODY MASS INDEX: 30.99 KG/M2 | SYSTOLIC BLOOD PRESSURE: 118 MMHG | DIASTOLIC BLOOD PRESSURE: 80 MMHG

## 2019-09-12 DIAGNOSIS — H53.9 VISION CHANGES: Primary | ICD-10-CM

## 2019-09-12 DIAGNOSIS — E55.9 VITAMIN D DEFICIENCY: ICD-10-CM

## 2019-09-12 DIAGNOSIS — F41.9 ANXIETY: ICD-10-CM

## 2019-09-12 DIAGNOSIS — E78.2 MIXED HYPERLIPIDEMIA: ICD-10-CM

## 2019-09-12 PROCEDURE — 3008F BODY MASS INDEX DOCD: CPT | Performed by: FAMILY MEDICINE

## 2019-09-12 PROCEDURE — 99214 OFFICE O/P EST MOD 30 MIN: CPT | Performed by: FAMILY MEDICINE

## 2019-09-12 NOTE — ASSESSMENT & PLAN NOTE
Counseling given on increased legume consumption, adding exercise in the form of walking, avoiding processed grains and soda

## 2019-09-12 NOTE — ASSESSMENT & PLAN NOTE
Persistent, related to work an many other components of daily life  Recommended margie-based MBSR (recommended [facility] margie for mindfulness and breathing exercises), as well as consideration of CBT for sleep margie through [place]

## 2019-09-12 NOTE — ASSESSMENT & PLAN NOTE
With worsening L eye vision changes in the absence of ophthalmologic pathology, especially with patient's history of migraines and L sided neurological symptoms that accompany her migraines, it's appropriate at this time to perform neuroimaging in the form of MRI

## 2019-09-24 ENCOUNTER — HOSPITAL ENCOUNTER (OUTPATIENT)
Dept: RADIOLOGY | Age: 49
Discharge: HOME | End: 2019-09-24
Payer: COMMERCIAL

## 2019-09-24 DIAGNOSIS — H53.9 VISION CHANGES: ICD-10-CM

## 2019-09-24 PROCEDURE — 70553 MRI BRAIN STEM W/O & W/DYE: CPT

## 2019-09-24 PROCEDURE — A9585 GADOBUTROL INJECTION: HCPCS | Performed by: FAMILY MEDICINE

## 2019-09-24 RX ADMIN — GADOBUTROL 9 ML: 604.72 INJECTION INTRAVENOUS at 09:15

## 2019-09-27 ENCOUNTER — TELEPHONE (OUTPATIENT)
Dept: FAMILY MEDICINE CLINIC | Facility: CLINIC | Age: 49
End: 2019-09-27

## 2019-09-27 NOTE — TELEPHONE ENCOUNTER
Spoke with patient - explained results of MRI, which did not show etiology for visual changes  Also explained presence of small area of abnormality consistent with capillaries vs small cavernoma  Will consider repeat MRI at one year

## 2019-10-21 ENCOUNTER — ANNUAL EXAM (OUTPATIENT)
Dept: OBGYN CLINIC | Facility: CLINIC | Age: 49
End: 2019-10-21
Payer: COMMERCIAL

## 2019-10-21 VITALS
DIASTOLIC BLOOD PRESSURE: 82 MMHG | BODY MASS INDEX: 31.32 KG/M2 | HEIGHT: 65 IN | WEIGHT: 188 LBS | SYSTOLIC BLOOD PRESSURE: 116 MMHG

## 2019-10-21 DIAGNOSIS — Z01.419 ENCOUNTER FOR ANNUAL ROUTINE GYNECOLOGICAL EXAMINATION: Primary | ICD-10-CM

## 2019-10-21 DIAGNOSIS — Z12.39 BREAST CANCER SCREENING: ICD-10-CM

## 2019-10-21 PROCEDURE — S0612 ANNUAL GYNECOLOGICAL EXAMINA: HCPCS | Performed by: OBSTETRICS & GYNECOLOGY

## 2019-10-21 NOTE — PROGRESS NOTES
Assessment/Plan:  Pap smear deferred due to low risk status  Encouraged self-breast examination as well as calcium supplementation  Continue annual mammogram   Reviewed taryn menopausal symptoms  She will continue to follow-up with her family physician as scheduled  Return to office in 1 year or p r n  No problem-specific Assessment & Plan notes found for this encounter  Diagnoses and all orders for this visit:    Encounter for annual routine gynecological examination    Breast cancer screening  -     Mammo screening bilateral w 3d & cad; Future          Subjective:      Patient ID: Michael Ro is a 51 y o  female  HPI     This is a 52-year-old female  ( x2, age 22, 26) presents for her annual gyn exam   Patient states her menstrual cycles are fairly regular every 4 weeks lasting 6-7 days described as lighter significantly since her endometrial ablation in 2016  She denies any changes in bowel or bladder function  Patient has been in a monogamous relationship with her  for over 22 years  Her Pap smears have been normal   Last Pap smear 2018 within normal limits  The following portions of the patient's history were reviewed and updated as appropriate: allergies, current medications, past family history, past medical history, past social history, past surgical history and problem list     Review of Systems   Constitutional: Negative for fatigue, fever and unexpected weight change  Respiratory: Negative for cough, chest tightness, shortness of breath and wheezing  Cardiovascular: Negative  Negative for chest pain and palpitations  Gastrointestinal: Negative  Negative for abdominal distention, abdominal pain, blood in stool, constipation, diarrhea, nausea and vomiting  Genitourinary: Negative  Negative for difficulty urinating, dyspareunia, dysuria, flank pain, frequency, genital sores, hematuria, pelvic pain, urgency, vaginal bleeding, vaginal discharge and vaginal pain  Skin: Negative for rash  Objective:      /82   Ht 5' 5" (1 651 m)   Wt 85 3 kg (188 lb)   LMP 10/07/2019 (Exact Date)   Breastfeeding? No   BMI 31 28 kg/m²          Physical Exam   Constitutional: She appears well-developed and well-nourished  Cardiovascular: Normal rate and regular rhythm  Pulmonary/Chest: Effort normal and breath sounds normal  Right breast exhibits no inverted nipple, no mass, no nipple discharge, no skin change and no tenderness  Left breast exhibits no inverted nipple, no mass, no nipple discharge, no skin change and no tenderness  Abdominal: Soft  Bowel sounds are normal  She exhibits no distension  There is no tenderness  There is no rebound and no guarding  Genitourinary: Vagina normal and uterus normal  There is no lesion on the right labia  There is no lesion on the left labia  Cervix exhibits no discharge and no friability  Right adnexum displays no mass, no tenderness and no fullness  Left adnexum displays no mass, no tenderness and no fullness  No vaginal discharge found  Genitourinary Comments: Uterus is globular top normal size  There is no evidence of pelvic floor prolapse

## 2020-03-02 ENCOUNTER — OFFICE VISIT (OUTPATIENT)
Dept: FAMILY MEDICINE CLINIC | Facility: CLINIC | Age: 50
End: 2020-03-02
Payer: COMMERCIAL

## 2020-03-02 VITALS
WEIGHT: 176.8 LBS | DIASTOLIC BLOOD PRESSURE: 76 MMHG | SYSTOLIC BLOOD PRESSURE: 114 MMHG | BODY MASS INDEX: 29.46 KG/M2 | HEIGHT: 65 IN | OXYGEN SATURATION: 98 % | HEART RATE: 96 BPM | TEMPERATURE: 98.2 F

## 2020-03-02 DIAGNOSIS — K52.1 ANTIBIOTIC-ASSOCIATED DIARRHEA: ICD-10-CM

## 2020-03-02 DIAGNOSIS — T36.95XA ANTIBIOTIC-ASSOCIATED DIARRHEA: ICD-10-CM

## 2020-03-02 DIAGNOSIS — H66.90 ACUTE OTITIS MEDIA, UNSPECIFIED OTITIS MEDIA TYPE: Primary | ICD-10-CM

## 2020-03-02 PROCEDURE — 99213 OFFICE O/P EST LOW 20 MIN: CPT | Performed by: FAMILY MEDICINE

## 2020-03-02 PROCEDURE — 1036F TOBACCO NON-USER: CPT | Performed by: FAMILY MEDICINE

## 2020-03-02 PROCEDURE — 3008F BODY MASS INDEX DOCD: CPT | Performed by: FAMILY MEDICINE

## 2020-03-02 RX ORDER — LEVOFLOXACIN 750 MG/1
750 TABLET ORAL DAILY
COMMUNITY
Start: 2020-02-28 | End: 2020-06-19 | Stop reason: ALTCHOICE

## 2020-03-02 RX ORDER — AMOXICILLIN 500 MG/1
CAPSULE ORAL
COMMUNITY
Start: 2020-02-19

## 2020-03-02 NOTE — PROGRESS NOTES
Family Medicine Follow-Up Office Visit  Michael Ro 53 y o  female   MRN: [id number] : 1970  ENCOUNTER: 3/2/2020 11:44 AM    Assessment and Plan   Acute otitis media  Patient completed a course of amoxicillin and has had clinical improvement  Was prescribed levaquin 3 days ago but had decided to d/c on her own  - Advised to d/c levaquin as antibiotics are not needed at this point in patients course  - Advised increased oral intake  - continue mucinex and vitamin C for congestion relief    Antibiotic-associated diarrhea  Patient has had only a few episodes of watery, non bloody diarrhea x3 days  This is most likely secondary to amoxicillin and levaquin use  - Amoxicillin course is now complete  - Advised to d/c levaquin as antibiotics are not required at this time  - Advised increased fluid intake with probiotics if diarrhea persists     RTC if symptoms re-appear or worsen      Chief Complaint     Chief Complaint   Patient presents with    Earache    URI       History of Present Illness   Michael Ro is a 53y o -year-old female who presents today after being treated for an ear infection  States 2 weeks ago she started to feel sick and her left ear began to ache  Was seen by [facility] and diagnosed with otitis media, prescribed amoxicillin for 10 days which she has now completed  She states that she woke up with a fever of 102 with a slight cough 3 days ago and was seen again at an urgent care, at that time she was given levaquin which made her joints hurt  She then discontinued the levaquin    Review of Systems   Review of Systems   Constitutional: Positive for appetite change  Negative for activity change, fatigue and fever  HENT: Negative for congestion, ear pain, rhinorrhea, sneezing and sore throat  Eyes: Negative for pain, discharge, redness and itching  Respiratory: Positive for cough  Negative for chest tightness, shortness of breath and wheezing      Cardiovascular: Negative for chest pain and palpitations  Gastrointestinal: Positive for diarrhea  Negative for abdominal distention, constipation, nausea and vomiting  Genitourinary: Negative for difficulty urinating and dysuria  Musculoskeletal: Negative for arthralgias, back pain and myalgias  Skin: Negative for rash  Neurological: Negative for dizziness, weakness, numbness and headaches  Hematological: Negative for adenopathy  Active Problem List     Patient Active Problem List   Diagnosis    Menorrhagia with regular cycle    Chronic fatigue    Screening for cardiovascular condition    Vitamin D deficiency    Mixed hyperlipidemia    Vision changes    Anxiety    Acute otitis media    Antibiotic-associated diarrhea       Past Medical History, Past Surgical History, Family History, and Social History were reviewed and updated today as appropriate  Objective   /76 (BP Location: Right arm, Patient Position: Sitting, Cuff Size: Large)   Pulse 96   Temp 98 2 °F (36 8 °C)   Ht 5' 5" (1 651 m)   Wt 80 2 kg (176 lb 12 8 oz)   SpO2 98%   BMI 29 42 kg/m²     Physical Exam   Constitutional: She appears well-developed and well-nourished  No distress  HENT:   Head: Normocephalic and atraumatic  Nose: Nose normal    Mouth/Throat: Oropharynx is clear and moist  No oropharyngeal exudate  Eyes: Conjunctivae are normal  Right eye exhibits no discharge  Left eye exhibits no discharge  No scleral icterus  Cardiovascular: Normal rate, regular rhythm and normal heart sounds  No murmur heard  Pulmonary/Chest: Effort normal and breath sounds normal  No respiratory distress  She has no wheezes  Musculoskeletal: She exhibits no tenderness  Lymphadenopathy:     She has no cervical adenopathy  Neurological: She is alert  Skin: Skin is warm and dry  No rash noted  She is not diaphoretic     Psychiatric: Her behavior is normal      Diabetic Foot Exam    Pertinent Laboratory/Diagnostic Studies:  Lab Results   Component Value Date    BUN 11 05/13/2019    CREATININE 0 80 05/13/2019    CALCIUM 9 0 05/13/2019    K 4 8 05/13/2019    CO2 28 05/13/2019     05/13/2019     Lab Results   Component Value Date    ALT 16 05/13/2019    AST 14 05/13/2019    ALKPHOS 59 05/13/2019       Lab Results   Component Value Date    WBC 5 8 05/13/2019    HGB 14 5 05/13/2019    HCT 42 9 05/13/2019    MCV 86 1 05/13/2019     05/13/2019       No results found for: TSH    Lab Results   Component Value Date    CHOL 209 (H) 11/07/2014     Lab Results   Component Value Date    TRIG 148 05/13/2019     Lab Results   Component Value Date    HDL 44 (L) 05/13/2019     No results found for: [facility]  Lab Results   Component Value Date    HGBA1C 5 4 02/02/2016       Results for orders placed or performed in visit on 05/13/19   Lipid Panel with Direct LDL reflex   Result Value Ref Range    Total Cholesterol 203 (H) <200 mg/dL    HDL 44 (L) >50 mg/dL    Triglycerides 148 <150 mg/dL    LDL Direct 133 (H) mg/dL (calc)    Chol HDLC Ratio 4 6 <5 0 (calc)    Non-HDL Cholesterol 159 (H) <130 mg/dL (calc)   Comprehensive metabolic panel   Result Value Ref Range    Glucose, Random 97 65 - 99 mg/dL    BUN 11 7 - 25 mg/dL    Creatinine 0 80 0 50 - 1 10 mg/dL    eGFR Non  87 > OR = 60 mL/min/1 73m2    eGFR  101 > OR = 60 mL/min/1 73m2    SL AMB BUN/CREATININE RATIO NOT APPLICABLE 6 - 22 (calc)    Sodium 138 135 - 146 mmol/L    Potassium 4 8 3 5 - 5 3 mmol/L    Chloride 104 98 - 110 mmol/L    CO2 28 20 - 32 mmol/L    SL AMB CALCIUM 9 0 8 6 - 10 2 mg/dL    Protein, Total 6 2 6 1 - 8 1 g/dL    Albumin 4 1 3 6 - 5 1 g/dL    Globulin 2 1 1 9 - 3 7 g/dL (calc)    Albumin/Globulin Ratio 2 0 1 0 - 2 5 (calc)    TOTAL BILIRUBIN 0 4 0 2 - 1 2 mg/dL    Alkaline Phosphatase 59 33 - 115 U/L    AST 14 10 - 35 U/L    ALT 16 6 - 29 U/L   CBC and differential   Result Value Ref Range    White Blood Cell Count 5 8 3 8 - 10 8 Thousand/uL    Red Blood Cell Count 4 98 3 80 - 5 10 Million/uL    Hemoglobin 14 5 11 7 - 15 5 g/dL    HCT 42 9 35 0 - 45 0 %    MCV 86 1 80 0 - 100 0 fL    MCH 29 1 27 0 - 33 0 pg    MCHC 33 8 32 0 - 36 0 g/dL    RDW 12 2 11 0 - 15 0 %    Platelet Count 723 155 - 400 Thousand/uL    SL AMB MPV 10 9 7 5 - 12 5 fL    Neutrophils (Absolute) 3,004 1,500 - 7,800 cells/uL    Lymphocytes (Absolute) 2,018 850 - 3,900 cells/uL    Monocytes (Absolute) 418 200 - 950 cells/uL    Eosinophils (Absolute) 278 15 - 500 cells/uL    Basophils ABS 81 0 - 200 cells/uL    Neutrophils 51 8 %    Lymphocytes 34 8 %    Monocytes 7 2 %    Eosinophils 4 8 %    Basophils PCT 1 4 %   Vitamin D 25 hydroxy   Result Value Ref Range    Vitamin D, 25-Hydroxy, Serum 22 (L) 30 - 100 ng/mL   TSH, 3rd generation with Free T4 reflex   Result Value Ref Range    TSH W/RFX TO FREE T4 1 74 mIU/L       No orders of the defined types were placed in this encounter  Current Medications     Current Outpatient Medications   Medication Sig Dispense Refill    aspirin-acetaminophen-caffeine (EXCEDRIN MIGRAINE) 250-250-65 MG per tablet Take 1 tablet by mouth every 6 (six) hours as needed for headaches   EPINEPHrine (EPIPEN) 0 3 mg/0 3 mL SOAJ Inject as directed      Multiple Vitamins-Minerals (MULTIVITAMIN WITH MINERALS) tablet Take by mouth      amoxicillin (AMOXIL) 500 mg capsule       aspirin 81 mg chewable tablet Chew 81 mg continuous as needed for mild pain   cholecalciferol (VITAMIN D3) 1,000 units tablet Take 1,000 Units by mouth daily      levofloxacin (LEVAQUIN) 750 mg tablet Take 750 mg by mouth daily       No current facility-administered medications for this visit          ALLERGIES:  Allergies   Allergen Reactions    Erythromycin GI Intolerance       Health Maintenance     Health Maintenance   Topic Date Due    HIV Screening  01/23/1986    BMI: Followup Plan  01/23/1989    Annual Physical  01/23/1989    Influenza Vaccine  07/01/2019    MAMMOGRAM  07/30/2020  Depression Screening PHQ  10/21/2020    BMI: Adult  10/21/2020    Cervical Cancer Screening  08/22/2021    DTaP,Tdap,and Td Vaccines (2 - Td) 11/07/2026    Pneumococcal Vaccine: 65+ Years (1 of 2 - PCV13) 01/23/2036    Pneumococcal Vaccine: Pediatrics (0 to 5 Years) and At-Risk Patients (6 to 59 Years)  Aged Out    HIB Vaccine  Aged Out    Hepatitis B Vaccine  Aged Out    IPV Vaccine  Aged Out    Hepatitis A Vaccine  Aged Out    Meningococcal ACWY Vaccine  Aged Out    HPV Vaccine  Aged [employer] History   Administered Date(s) Administered    Influenza Quadrivalent, 6-35 Months IM 11/07/2016    Tdap 11/07/2016    Tuberculin Skin Test-PPD Intradermal 05/09/2014         Cesario Pereyra MD   [address]  3/2/2020  11:44 AM    Parts of this note were dictated using [employer] dictation software and may have sounds-like errors due to variation in pronunciation

## 2020-06-19 DIAGNOSIS — L25.5 RHUS DERMATITIS: Primary | ICD-10-CM

## 2020-06-19 RX ORDER — PREDNISONE 10 MG/1
TABLET ORAL
Qty: 30 TABLET | Refills: 0 | Status: SHIPPED | OUTPATIENT
Start: 2020-06-19

## 2020-07-31 DIAGNOSIS — Z12.39 BREAST CANCER SCREENING: ICD-10-CM

## 2021-03-11 ENCOUNTER — TELEPHONE (OUTPATIENT)
Dept: OBGYN CLINIC | Facility: CLINIC | Age: 51
End: 2021-03-11

## 2023-07-30 NOTE — ASSESSMENT & PLAN NOTE
Patient completed a course of amoxicillin and has had clinical improvement  Was prescribed levaquin 3 days ago but had decided to d/c on her own      - Advised to d/c levaquin as antibiotics are not needed at this point in patients course  - Advised increased oral intake  - continue mucinex and vitamin C for congestion relief
Patient has had only a few episodes of watery, non bloody diarrhea x3 days  This is most likely secondary to amoxicillin and levaquin use      - Amoxicillin course is now complete  - Advised to d/c levaquin as antibiotics are not required at this time  - Advised increased fluid intake with probiotics if diarrhea persists     RTC if symptoms re-appear or worsen
show